# Patient Record
Sex: MALE | Race: OTHER | Employment: UNEMPLOYED | URBAN - METROPOLITAN AREA
[De-identification: names, ages, dates, MRNs, and addresses within clinical notes are randomized per-mention and may not be internally consistent; named-entity substitution may affect disease eponyms.]

---

## 2024-08-14 ENCOUNTER — HOSPITAL ENCOUNTER (EMERGENCY)
Age: 34
Discharge: HOME | End: 2024-08-14

## 2024-08-14 VITALS
WEIGHT: 141.98 LBS | SYSTOLIC BLOOD PRESSURE: 136 MMHG | DIASTOLIC BLOOD PRESSURE: 96 MMHG | RESPIRATION RATE: 14 BRPM | HEART RATE: 98 BPM | TEMPERATURE: 98.3 F | OXYGEN SATURATION: 98 %

## 2024-08-14 DIAGNOSIS — B34.9 VIRAL SYNDROME: Primary | ICD-10-CM

## 2024-08-14 LAB — SARS-COV-2 RDRP RESP QL NAA+PROBE: NOT DETECTED

## 2024-08-14 PROCEDURE — 99283 EMERGENCY DEPT VISIT LOW MDM: CPT

## 2024-08-14 PROCEDURE — 87635 SARS-COV-2 COVID-19 AMP PRB: CPT

## 2024-08-14 PROCEDURE — 6370000000 HC RX 637 (ALT 250 FOR IP): Performed by: GENERAL ACUTE CARE HOSPITAL

## 2024-08-14 RX ORDER — IBUPROFEN 800 MG/1
800 TABLET ORAL ONCE
Status: COMPLETED | OUTPATIENT
Start: 2024-08-14 | End: 2024-08-14

## 2024-08-14 RX ADMIN — IBUPROFEN 800 MG: 800 TABLET, FILM COATED ORAL at 16:48

## 2024-08-14 ASSESSMENT — PAIN - FUNCTIONAL ASSESSMENT: PAIN_FUNCTIONAL_ASSESSMENT: NONE - DENIES PAIN

## 2024-08-14 ASSESSMENT — PAIN SCALES - GENERAL: PAINLEVEL_OUTOF10: 0

## 2024-08-14 NOTE — DISCHARGE INSTRUCTIONS
Fluid intake.  Maintain bland diet for the next several days.  Follow-up as directed.  Take OTC Tylenol or ibuprofen as directed.  Return for new or worsening symptoms.

## 2024-08-14 NOTE — ED PROVIDER NOTES
[facility]  EMERGENCY DEPARTMENT ENCOUNTER        Pt Name: Deshaun De Leon  MRN: [id number]  Birthdate 1990  Date of evaluation: 8/14/2024  Provider: JESS Wilkins CNP  PCP: No primary care provider on file.  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        HISTORY OF PRESENT ILLNESS: 1 or more Elements     History From: ***  {Limitations to history (Optional):75540}    Deshaun De Leon is a 34 y.o. male who presents ***    Nursing Notes were all reviewed and agreed with or any disagreements were addressed in the HPI.    REVIEW OF SYSTEMS :      Review of Systems    Positives and Pertinent negatives as per HPI.     SURGICAL HISTORY   No past surgical history on file.    CURRENTMEDICATIONS       Previous Medications    No medications on file       ALLERGIES     Patient has no known allergies.    FAMILYHISTORY     No family history on file.     SOCIAL HISTORY       Social History     Tobacco Use    Smoking status: Never    Smokeless tobacco: Never   Substance Use Topics    Alcohol use: Yes       SCREENINGS          Michelle Coma Scale  Eye Opening: Spontaneous  Best Verbal Response: Oriented  Best Motor Response: Obeys commands  Michelle Coma Scale Score: 15                        CIWA Assessment  BP: (!) 136/96  Pulse: 98             PHYSICAL EXAM  1 or more Elements     ED Triage Vitals [08/14/24 1609]   BP Systolic BP Percentile Diastolic BP Percentile Temp Temp Source Pulse Resp SpO2   (!) 136/96 -- -- 98.3 °F (36.8 °C) Oral 98 -- --      Height Weight         -- --             Physical Exam    ***    DIAGNOSTIC RESULTS   LABS:    Labs Reviewed   COVID-19, RAPID       When ordered only abnormal lab results are displayed. All other labs were  reasons which may require a return visit and the importance of follow-up care.  The patient is well-appearing, nontoxic, and improved at the time of discharge.  Patient agrees to call to arrange follow-up care as directed.   Patient understands to return immediately for worsening/change in symptoms.                 I am the Primary Clinician of Record.  FINAL IMPRESSION      1. Viral syndrome          DISPOSITION/PLAN     DISPOSITION Decision To Discharge 08/14/2024 05:45:55 PM  Condition at Disposition: Stable      PATIENT REFERRED TO:  Family Medicine, Physician, MD  [address] [facility] [address] [phone]    Call in 1 day        DISCHARGE MEDICATIONS:  There are no discharge medications for this patient.      DISCONTINUED MEDICATIONS:  There are no discharge medications for this patient.             (Please note that portions of this note were completed with a voice recognition program.  Efforts were made to edit the dictations but occasionally words are mis-transcribed.)    JESS Wilkins CNP (electronically signed)        Lenka Landrum APRN - CNP  08/20/24 [id number]

## 2024-08-20 ASSESSMENT — ENCOUNTER SYMPTOMS
CHEST TIGHTNESS: 0
VOMITING: 0
COUGH: 0
VOICE CHANGE: 0
BACK PAIN: 0
SHORTNESS OF BREATH: 0
DIARRHEA: 0
WHEEZING: 0
NAUSEA: 0
ABDOMINAL PAIN: 1
SORE THROAT: 0